# Patient Record
Sex: MALE | Race: WHITE | NOT HISPANIC OR LATINO
[De-identification: names, ages, dates, MRNs, and addresses within clinical notes are randomized per-mention and may not be internally consistent; named-entity substitution may affect disease eponyms.]

---

## 2021-12-08 PROBLEM — Z00.00 ENCOUNTER FOR PREVENTIVE HEALTH EXAMINATION: Status: ACTIVE | Noted: 2021-12-08

## 2021-12-23 ENCOUNTER — APPOINTMENT (OUTPATIENT)
Dept: NEUROLOGY | Facility: CLINIC | Age: 76
End: 2021-12-23
Payer: COMMERCIAL

## 2021-12-23 ENCOUNTER — NON-APPOINTMENT (OUTPATIENT)
Age: 76
End: 2021-12-23

## 2021-12-23 VITALS
TEMPERATURE: 98.2 F | BODY MASS INDEX: 24.66 KG/M2 | HEIGHT: 65 IN | DIASTOLIC BLOOD PRESSURE: 71 MMHG | SYSTOLIC BLOOD PRESSURE: 113 MMHG | HEART RATE: 48 BPM | WEIGHT: 148 LBS

## 2021-12-23 DIAGNOSIS — G45.9 TRANSIENT CEREBRAL ISCHEMIC ATTACK, UNSPECIFIED: ICD-10-CM

## 2021-12-23 PROCEDURE — 99213 OFFICE O/P EST LOW 20 MIN: CPT

## 2021-12-23 RX ORDER — ASPIRIN 81 MG
81 TABLET, DELAYED RELEASE (ENTERIC COATED) ORAL
Refills: 0 | Status: ACTIVE | COMMUNITY

## 2021-12-23 RX ORDER — APIXABAN 5 MG/1
5 TABLET, FILM COATED ORAL
Refills: 0 | Status: ACTIVE | COMMUNITY

## 2021-12-23 RX ORDER — ESOMEPRAZOLE MAGNESIUM 20 MG/1
TABLET ORAL
Refills: 0 | Status: ACTIVE | COMMUNITY

## 2021-12-23 RX ORDER — TAMSULOSIN HYDROCHLORIDE 0.4 MG/1
0.4 CAPSULE ORAL
Refills: 0 | Status: ACTIVE | COMMUNITY

## 2021-12-23 RX ORDER — ROSUVASTATIN CALCIUM 40 MG/1
40 TABLET, FILM COATED ORAL
Refills: 0 | Status: ACTIVE | COMMUNITY

## 2021-12-23 RX ORDER — ATENOLOL 25 MG/1
25 TABLET ORAL
Refills: 0 | Status: ACTIVE | COMMUNITY

## 2021-12-23 NOTE — CONSULT LETTER
[Dear  ___] : Dear  [unfilled], [Consult Letter:] : I had the pleasure of evaluating your patient, [unfilled]. [Please see my note below.] : Please see my note below. [Consult Closing:] : Thank you very much for allowing me to participate in the care of this patient.  If you have any questions, please do not hesitate to contact me. [Sincerely,] : Sincerely, [DrAlex  ___] : Dr. VALENCIA [FreeTextEntry3] : Jordana Packer M.D.\par Joy Calabrese N.P.\par

## 2021-12-23 NOTE — DATA REVIEWED
[de-identified] : 6/22/20\par MRI Brain\par IMPRESSION: Minimal findings of chronic small vessel ischemia/infarction. No MRI evidence of recent\par infarction. It should be noted that a majority of MRI studies performed on normal people of the \par patient's age will demonstrate similar or more findings of small vessel vascular disease.  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 There is no MRI evidence of \par recent hemorrhage.  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  \par    \par 6/22/20 MRA neck\par IMPRESSION: Vascular tortuosity. No stenosis.  \par  [de-identified] : 6/23/20 HgA1C 6\par 6/22/20 LDL 68.6\par 7/14/20 CV Transesophageal echo\par  Echodensity measuring 0.8 x 0.7 cm in the left atrial appendage consistent with  \par  thrombus. \par  Decreased flow velocities in the left atrial appendage. \par  Mild plaque in the arch and proximal descending aorta. \par  Normal left ventricular systolic function. \par  Normal right ventricular systolic function. \par  Right ventricular systolic pressure 24-39 mmHg. \par  MV repair with thickened leaflets. \par  Mild-to-moderate mitral valve regurgitation. \par  Aortic valve sclerosis without stenosis. \par  Mild aortic valve regurgitation. \par  Mild-to-moderate tricuspid valve regurgitation. \par  No pericardial effusion. \par  No prior available. \par  Spoke with Dr. Calixto regarding above.

## 2021-12-23 NOTE — PHYSICAL EXAM
[FreeTextEntry1] : Physical examination \par General: No acute distress, Awake, Alert.   \par  \par \par Mental status \par Awake, alert, gives detailed history.\par \par Cranial Nerves \par II: VFF  \par III, IV, VI: PERRL, EOMI.   \par V: Facial sensation is normal B/L.   \par VII: Facial strength is normal B/L. \par \par \par VIII: Decreased hearing, bilaterally. \par \par IX, X: Palate is midline and elevates symmetrically.   \par XI: Trapezius normal strength.   \par XII: Tongue midline without atrophy or fasciculations. \par \par Motor exam  \par Muscle tone - no evidence of rigidity or resistance in all 4 extremities.  \par No atrophy or fasciculations \par Muscle Strength: arms and legs, proximal and distal flexors and extensors are normal \par \par No UE drift.\par \par Reflexes \par All present, normal, and symmetrical.   \par   \par \par Plantars right: mute.   \par Plantars left: mute.   \par  \par \par Coordination \par Finger to nose: Normal.  \par Heel to shin: Normal.    \par \par Sensory \par Intact sensation to vibration and cold. \par \par Gait \par Normal including heels, toes, and tandem gait.  \par \par  \par \par

## 2021-12-23 NOTE — ASSESSMENT
Choroidal Nevus OS: Appears Benign and stable. Observe for changes. [FreeTextEntry1] : Rafat Islas is a 76 year old with TIA - cardioembolic source found on HANSA -  " Echodensity measuring 0.8 x 0.7 cm in the left atrial appendage consistent with thrombus."  Therefore, I recommend continuing Eliquis for stroke risk reduction and follow up with his cardiologist for an implantable loop recorder.   He is at high risk for having atrial fibrillation \par  \par He is already also on statin, antiplatelet agent and anti-HTN medications for stroke risk reduction.\par \par Follow up with vascular neurologist Dr. Cárdenas. \par \par I discussed in detail with the patient the diagnosis, prognosis, treatment plan and answered all of his questions.\par \par

## 2021-12-23 NOTE — HISTORY OF PRESENT ILLNESS
[FreeTextEntry1] : Rafat Islas is a 76 year old man with a history of CABG, MV repair presenting for a hospital follow up since his hospitalization in 6/22/20 for TIA.\par \par He had a HANSA done in July 2020 at [facility] which showed a left atrial appendage thrombus. He is currently taking Aspirin 81mg and Eliquis 5mg twice daily. His cardiologist recommended following up with neurology about deciding whether to stop the Eliquis. \par \par Mr. Islas has not had any further events.\par \par The remaining neurological review of systems is negative.\par \par \par History of Present Illness from hospital note 6/22/20\par "Mr. Islas is a 74 yo man with sudden onset of right facial droop yesterday at 5:30 PM. This lasted \par about 15 minutes. He had no other neurological symptoms. He has a h/o CABG/MV repair and takes \par aspirin, Crestor and atenolol. He has not missed any doses of ASA. \par PFSH, medications, allergies and review of systems as documented in the H P done on 6/22/2020 \par without any changes."\par

## 2021-12-23 NOTE — REVIEW OF SYSTEMS
[Feeling Tired] : feeling tired [Anxiety] : anxiety [Negative] : ENT [Fever] : no fever [Chills] : no chills [Feeling Poorly] : not feeling poorly [Depression] : no depression [Numbness] : no numbness [Tingling] : no tingling [Seizures] : no convulsions [Dizziness] : no dizziness [Vertigo] : no vertigo [Tension Headache] : no tension-type headache [Difficulty Walking] : no difficulty walking [Eye Pain] : no eye pain [Red Eyes] : eyes not red [Loss Of Hearing] : no hearing loss [Chest Pain] : no chest pain [Palpitations] : no palpitations [Shortness Of Breath] : no shortness of breath [Wheezing] : no wheezing [Abdominal Pain] : no abdominal pain [Vomiting] : no vomiting [Constipation] : no constipation [Diarrhea] : no diarrhea [Incontinence] : no incontinence [Joint Pain] : no joint pain [Joint Swelling] : no joint swelling [Joint Stiffness] : no joint stiffness [Skin Wound] : no skin wound [Itching] : no itching [Muscle Weakness] : no muscle weakness [Easy Bleeding] : no tendency for easy bleeding [Easy Bruising] : no tendency for easy bruising [Swollen Glands] : no swollen glands